# Patient Record
Sex: MALE | Race: WHITE | Employment: FULL TIME | URBAN - METROPOLITAN AREA
[De-identification: names, ages, dates, MRNs, and addresses within clinical notes are randomized per-mention and may not be internally consistent; named-entity substitution may affect disease eponyms.]

---

## 2018-12-18 ENCOUNTER — APPOINTMENT (OUTPATIENT)
Dept: CT IMAGING | Age: 23
DRG: 863 | End: 2018-12-18
Attending: EMERGENCY MEDICINE
Payer: OTHER GOVERNMENT

## 2018-12-18 ENCOUNTER — HOSPITAL ENCOUNTER (INPATIENT)
Age: 23
LOS: 2 days | Discharge: HOME | DRG: 863 | End: 2018-12-21
Attending: EMERGENCY MEDICINE | Admitting: FAMILY MEDICINE
Payer: OTHER GOVERNMENT

## 2018-12-18 DIAGNOSIS — R10.31 ABDOMINAL PAIN, RIGHT LOWER QUADRANT: Primary | ICD-10-CM

## 2018-12-18 LAB
COMMENT, HOLDF: NORMAL
LACTATE BLD-SCNC: 1.15 MMOL/L (ref 0.4–2)
SAMPLES BEING HELD,HOLD: NORMAL

## 2018-12-18 PROCEDURE — 96361 HYDRATE IV INFUSION ADD-ON: CPT

## 2018-12-18 PROCEDURE — 74011250637 HC RX REV CODE- 250/637: Performed by: EMERGENCY MEDICINE

## 2018-12-18 PROCEDURE — 87040 BLOOD CULTURE FOR BACTERIA: CPT

## 2018-12-18 PROCEDURE — 85025 COMPLETE CBC W/AUTO DIFF WBC: CPT

## 2018-12-18 PROCEDURE — 80053 COMPREHEN METABOLIC PANEL: CPT

## 2018-12-18 PROCEDURE — 36415 COLL VENOUS BLD VENIPUNCTURE: CPT

## 2018-12-18 PROCEDURE — 83605 ASSAY OF LACTIC ACID: CPT

## 2018-12-18 PROCEDURE — 74011250636 HC RX REV CODE- 250/636: Performed by: EMERGENCY MEDICINE

## 2018-12-18 PROCEDURE — 99285 EMERGENCY DEPT VISIT HI MDM: CPT

## 2018-12-18 RX ORDER — SODIUM CHLORIDE 0.9 % (FLUSH) 0.9 %
10 SYRINGE (ML) INJECTION
Status: COMPLETED | OUTPATIENT
Start: 2018-12-18 | End: 2018-12-19

## 2018-12-18 RX ORDER — ACETAMINOPHEN 500 MG
1000 TABLET ORAL
Status: COMPLETED | OUTPATIENT
Start: 2018-12-18 | End: 2018-12-18

## 2018-12-18 RX ADMIN — SODIUM CHLORIDE 1000 ML: 900 INJECTION, SOLUTION INTRAVENOUS at 00:00

## 2018-12-18 RX ADMIN — ACETAMINOPHEN 1000 MG: 500 TABLET ORAL at 23:00

## 2018-12-18 RX ADMIN — SODIUM CHLORIDE 1000 ML: 900 INJECTION, SOLUTION INTRAVENOUS at 23:21

## 2018-12-19 ENCOUNTER — APPOINTMENT (OUTPATIENT)
Dept: CT IMAGING | Age: 23
DRG: 863 | End: 2018-12-19
Attending: EMERGENCY MEDICINE
Payer: OTHER GOVERNMENT

## 2018-12-19 ENCOUNTER — APPOINTMENT (OUTPATIENT)
Dept: GENERAL RADIOLOGY | Age: 23
DRG: 863 | End: 2018-12-19
Attending: EMERGENCY MEDICINE
Payer: OTHER GOVERNMENT

## 2018-12-19 PROBLEM — R10.9 ABDOMINAL PAIN: Status: ACTIVE | Noted: 2018-12-19

## 2018-12-19 PROBLEM — R65.10 SIRS (SYSTEMIC INFLAMMATORY RESPONSE SYNDROME) (HCC): Status: ACTIVE | Noted: 2018-12-19

## 2018-12-19 PROBLEM — D72.829 LEUKOCYTOSIS: Status: ACTIVE | Noted: 2018-12-19

## 2018-12-19 PROBLEM — R00.0 TACHYCARDIA: Status: ACTIVE | Noted: 2018-12-19

## 2018-12-19 LAB
ABO + RH BLD: NORMAL
ALBUMIN SERPL-MCNC: 3.7 G/DL (ref 3.5–5)
ALBUMIN/GLOB SERPL: 0.7 {RATIO} (ref 1.1–2.2)
ALP SERPL-CCNC: 86 U/L (ref 45–117)
ALT SERPL-CCNC: 23 U/L (ref 12–78)
ANION GAP SERPL CALC-SCNC: 6 MMOL/L (ref 5–15)
ANION GAP SERPL CALC-SCNC: 7 MMOL/L (ref 5–15)
APPEARANCE UR: CLEAR
AST SERPL-CCNC: 19 U/L (ref 15–37)
ATRIAL RATE: 76 BPM
BACTERIA URNS QL MICRO: NEGATIVE /HPF
BASOPHILS # BLD: 0 K/UL (ref 0–0.1)
BASOPHILS # BLD: 0.1 K/UL (ref 0–0.1)
BASOPHILS NFR BLD: 0 % (ref 0–1)
BASOPHILS NFR BLD: 0 % (ref 0–1)
BILIRUB SERPL-MCNC: 0.7 MG/DL (ref 0.2–1)
BILIRUB UR QL: NEGATIVE
BLOOD GROUP ANTIBODIES SERPL: NORMAL
BUN SERPL-MCNC: 11 MG/DL (ref 6–20)
BUN SERPL-MCNC: 14 MG/DL (ref 6–20)
BUN/CREAT SERPL: 13 (ref 12–20)
BUN/CREAT SERPL: 13 (ref 12–20)
CALCIUM SERPL-MCNC: 8.5 MG/DL (ref 8.5–10.1)
CALCIUM SERPL-MCNC: 9.3 MG/DL (ref 8.5–10.1)
CALCULATED P AXIS, ECG09: 24 DEGREES
CALCULATED R AXIS, ECG10: 50 DEGREES
CALCULATED T AXIS, ECG11: 23 DEGREES
CHLORIDE SERPL-SCNC: 104 MMOL/L (ref 97–108)
CHLORIDE SERPL-SCNC: 96 MMOL/L (ref 97–108)
CO2 SERPL-SCNC: 25 MMOL/L (ref 21–32)
CO2 SERPL-SCNC: 28 MMOL/L (ref 21–32)
COLOR UR: NORMAL
CREAT SERPL-MCNC: 0.82 MG/DL (ref 0.7–1.3)
CREAT SERPL-MCNC: 1.12 MG/DL (ref 0.7–1.3)
DIAGNOSIS, 93000: NORMAL
DIFFERENTIAL METHOD BLD: ABNORMAL
DIFFERENTIAL METHOD BLD: ABNORMAL
EOSINOPHIL # BLD: 0 K/UL (ref 0–0.4)
EOSINOPHIL # BLD: 0.1 K/UL (ref 0–0.4)
EOSINOPHIL NFR BLD: 0 % (ref 0–7)
EOSINOPHIL NFR BLD: 0 % (ref 0–7)
EPITH CASTS URNS QL MICRO: NORMAL /LPF
ERYTHROCYTE [DISTWIDTH] IN BLOOD BY AUTOMATED COUNT: 12.3 % (ref 11.5–14.5)
ERYTHROCYTE [DISTWIDTH] IN BLOOD BY AUTOMATED COUNT: 12.5 % (ref 11.5–14.5)
GLOBULIN SER CALC-MCNC: 5.5 G/DL (ref 2–4)
GLUCOSE SERPL-MCNC: 94 MG/DL (ref 65–100)
GLUCOSE SERPL-MCNC: 97 MG/DL (ref 65–100)
GLUCOSE UR STRIP.AUTO-MCNC: NEGATIVE MG/DL
HCT VFR BLD AUTO: 35 % (ref 36.6–50.3)
HCT VFR BLD AUTO: 43 % (ref 36.6–50.3)
HGB BLD-MCNC: 11.6 G/DL (ref 12.1–17)
HGB BLD-MCNC: 14.5 G/DL (ref 12.1–17)
HGB UR QL STRIP: NEGATIVE
IMM GRANULOCYTES # BLD: 0 K/UL
IMM GRANULOCYTES # BLD: 0.2 K/UL (ref 0–0.04)
IMM GRANULOCYTES NFR BLD AUTO: 0 %
IMM GRANULOCYTES NFR BLD AUTO: 1 % (ref 0–0.5)
KETONES UR QL STRIP.AUTO: NEGATIVE MG/DL
LACTATE SERPL-SCNC: 0.9 MMOL/L (ref 0.4–2)
LEUKOCYTE ESTERASE UR QL STRIP.AUTO: NEGATIVE
LYMPHOCYTES # BLD: 1.7 K/UL (ref 0.8–3.5)
LYMPHOCYTES # BLD: 3 K/UL (ref 0.8–3.5)
LYMPHOCYTES NFR BLD: 13 % (ref 12–49)
LYMPHOCYTES NFR BLD: 9 % (ref 12–49)
MCH RBC QN AUTO: 29.6 PG (ref 26–34)
MCH RBC QN AUTO: 30.3 PG (ref 26–34)
MCHC RBC AUTO-ENTMCNC: 33.1 G/DL (ref 30–36.5)
MCHC RBC AUTO-ENTMCNC: 33.7 G/DL (ref 30–36.5)
MCV RBC AUTO: 89.3 FL (ref 80–99)
MCV RBC AUTO: 89.8 FL (ref 80–99)
MONOCYTES # BLD: 1.8 K/UL (ref 0–1)
MONOCYTES # BLD: 2.7 K/UL (ref 0–1)
MONOCYTES NFR BLD: 10 % (ref 5–13)
MONOCYTES NFR BLD: 12 % (ref 5–13)
NEUTS SEG # BLD: 15.2 K/UL (ref 1.8–8)
NEUTS SEG # BLD: 17.2 K/UL (ref 1.8–8)
NEUTS SEG NFR BLD: 75 % (ref 32–75)
NEUTS SEG NFR BLD: 80 % (ref 32–75)
NITRITE UR QL STRIP.AUTO: NEGATIVE
NRBC # BLD: 0 K/UL (ref 0–0.01)
NRBC # BLD: 0 K/UL (ref 0–0.01)
NRBC BLD-RTO: 0 PER 100 WBC
NRBC BLD-RTO: 0 PER 100 WBC
P-R INTERVAL, ECG05: 142 MS
PH UR STRIP: 6 [PH] (ref 5–8)
PLATELET # BLD AUTO: 282 K/UL (ref 150–400)
PLATELET # BLD AUTO: 356 K/UL (ref 150–400)
PMV BLD AUTO: 10.1 FL (ref 8.9–12.9)
PMV BLD AUTO: 9.9 FL (ref 8.9–12.9)
POTASSIUM SERPL-SCNC: 4.1 MMOL/L (ref 3.5–5.1)
POTASSIUM SERPL-SCNC: 4.2 MMOL/L (ref 3.5–5.1)
PROT SERPL-MCNC: 9.2 G/DL (ref 6.4–8.2)
PROT UR STRIP-MCNC: NEGATIVE MG/DL
Q-T INTERVAL, ECG07: 404 MS
QRS DURATION, ECG06: 94 MS
QTC CALCULATION (BEZET), ECG08: 454 MS
RBC # BLD AUTO: 3.92 M/UL (ref 4.1–5.7)
RBC # BLD AUTO: 4.79 M/UL (ref 4.1–5.7)
RBC #/AREA URNS HPF: NORMAL /HPF (ref 0–5)
RBC MORPH BLD: ABNORMAL
SODIUM SERPL-SCNC: 131 MMOL/L (ref 136–145)
SODIUM SERPL-SCNC: 135 MMOL/L (ref 136–145)
SP GR UR REFRACTOMETRY: 1.01 (ref 1–1.03)
SPECIMEN EXP DATE BLD: NORMAL
UR CULT HOLD, URHOLD: NORMAL
UROBILINOGEN UR QL STRIP.AUTO: 0.2 EU/DL (ref 0.2–1)
VENTRICULAR RATE, ECG03: 76 BPM
WBC # BLD AUTO: 19 K/UL (ref 4.1–11.1)
WBC # BLD AUTO: 22.9 K/UL (ref 4.1–11.1)
WBC URNS QL MICRO: NORMAL /HPF (ref 0–4)

## 2018-12-19 PROCEDURE — 74011000258 HC RX REV CODE- 258: Performed by: RADIOLOGY

## 2018-12-19 PROCEDURE — 86900 BLOOD TYPING SEROLOGIC ABO: CPT

## 2018-12-19 PROCEDURE — 81001 URINALYSIS AUTO W/SCOPE: CPT

## 2018-12-19 PROCEDURE — 65660000000 HC RM CCU STEPDOWN

## 2018-12-19 PROCEDURE — 80048 BASIC METABOLIC PNL TOTAL CA: CPT

## 2018-12-19 PROCEDURE — 83605 ASSAY OF LACTIC ACID: CPT

## 2018-12-19 PROCEDURE — 71250 CT THORAX DX C-: CPT

## 2018-12-19 PROCEDURE — 74011000258 HC RX REV CODE- 258: Performed by: FAMILY MEDICINE

## 2018-12-19 PROCEDURE — 96374 THER/PROPH/DIAG INJ IV PUSH: CPT

## 2018-12-19 PROCEDURE — 74011250636 HC RX REV CODE- 250/636: Performed by: EMERGENCY MEDICINE

## 2018-12-19 PROCEDURE — 74177 CT ABD & PELVIS W/CONTRAST: CPT

## 2018-12-19 PROCEDURE — 74011000258 HC RX REV CODE- 258: Performed by: EMERGENCY MEDICINE

## 2018-12-19 PROCEDURE — 71046 X-RAY EXAM CHEST 2 VIEWS: CPT

## 2018-12-19 PROCEDURE — 85025 COMPLETE CBC W/AUTO DIFF WBC: CPT

## 2018-12-19 PROCEDURE — 74011250636 HC RX REV CODE- 250/636: Performed by: FAMILY MEDICINE

## 2018-12-19 PROCEDURE — 93005 ELECTROCARDIOGRAM TRACING: CPT

## 2018-12-19 PROCEDURE — 36415 COLL VENOUS BLD VENIPUNCTURE: CPT

## 2018-12-19 PROCEDURE — 74011636320 HC RX REV CODE- 636/320: Performed by: RADIOLOGY

## 2018-12-19 RX ORDER — ONDANSETRON 2 MG/ML
4 INJECTION INTRAMUSCULAR; INTRAVENOUS
Status: DISCONTINUED | OUTPATIENT
Start: 2018-12-19 | End: 2018-12-21 | Stop reason: HOSPADM

## 2018-12-19 RX ORDER — LEVOFLOXACIN 5 MG/ML
750 INJECTION, SOLUTION INTRAVENOUS EVERY 24 HOURS
Status: DISCONTINUED | OUTPATIENT
Start: 2018-12-19 | End: 2018-12-21 | Stop reason: HOSPADM

## 2018-12-19 RX ORDER — LEVOFLOXACIN 5 MG/ML
750 INJECTION, SOLUTION INTRAVENOUS ONCE
Status: DISCONTINUED | OUTPATIENT
Start: 2018-12-19 | End: 2018-12-19 | Stop reason: SDUPTHER

## 2018-12-19 RX ORDER — SODIUM CHLORIDE 9 MG/ML
25 INJECTION, SOLUTION INTRAVENOUS CONTINUOUS
Status: DISCONTINUED | OUTPATIENT
Start: 2018-12-19 | End: 2018-12-21 | Stop reason: HOSPADM

## 2018-12-19 RX ORDER — VANCOMYCIN HYDROCHLORIDE
1250 EVERY 8 HOURS
Status: DISCONTINUED | OUTPATIENT
Start: 2018-12-19 | End: 2018-12-21 | Stop reason: HOSPADM

## 2018-12-19 RX ORDER — ACETAMINOPHEN 325 MG/1
650 TABLET ORAL
Status: DISCONTINUED | OUTPATIENT
Start: 2018-12-19 | End: 2018-12-21 | Stop reason: HOSPADM

## 2018-12-19 RX ORDER — SODIUM CHLORIDE 0.9 % (FLUSH) 0.9 %
5-10 SYRINGE (ML) INJECTION EVERY 8 HOURS
Status: DISCONTINUED | OUTPATIENT
Start: 2018-12-19 | End: 2018-12-21 | Stop reason: HOSPADM

## 2018-12-19 RX ORDER — ACETAMINOPHEN 650 MG/1
650 SUPPOSITORY RECTAL
Status: DISCONTINUED | OUTPATIENT
Start: 2018-12-19 | End: 2018-12-19

## 2018-12-19 RX ORDER — SODIUM CHLORIDE 0.9 % (FLUSH) 0.9 %
5-10 SYRINGE (ML) INJECTION AS NEEDED
Status: DISCONTINUED | OUTPATIENT
Start: 2018-12-19 | End: 2018-12-21 | Stop reason: HOSPADM

## 2018-12-19 RX ORDER — ACETAMINOPHEN 325 MG/1
650 TABLET ORAL
Status: DISCONTINUED | OUTPATIENT
Start: 2018-12-19 | End: 2018-12-19

## 2018-12-19 RX ORDER — VANCOMYCIN 2 GRAM/500 ML IN 0.9 % SODIUM CHLORIDE INTRAVENOUS
2000 ONCE
Status: COMPLETED | OUTPATIENT
Start: 2018-12-19 | End: 2018-12-19

## 2018-12-19 RX ORDER — FENTANYL CITRATE 50 UG/ML
12.5 INJECTION, SOLUTION INTRAMUSCULAR; INTRAVENOUS
Status: DISCONTINUED | OUTPATIENT
Start: 2018-12-19 | End: 2018-12-21 | Stop reason: HOSPADM

## 2018-12-19 RX ADMIN — VANCOMYCIN HYDROCHLORIDE 2000 MG: 10 INJECTION, POWDER, LYOPHILIZED, FOR SOLUTION INTRAVENOUS at 04:12

## 2018-12-19 RX ADMIN — VANCOMYCIN HYDROCHLORIDE 1250 MG: 10 INJECTION, POWDER, LYOPHILIZED, FOR SOLUTION INTRAVENOUS at 19:32

## 2018-12-19 RX ADMIN — SODIUM CHLORIDE 1000 ML: 900 INJECTION, SOLUTION INTRAVENOUS at 04:44

## 2018-12-19 RX ADMIN — SODIUM CHLORIDE 448 ML: 900 INJECTION, SOLUTION INTRAVENOUS at 04:44

## 2018-12-19 RX ADMIN — VANCOMYCIN HYDROCHLORIDE 1250 MG: 10 INJECTION, POWDER, LYOPHILIZED, FOR SOLUTION INTRAVENOUS at 11:45

## 2018-12-19 RX ADMIN — PIPERACILLIN SODIUM,TAZOBACTAM SODIUM 3.38 G: 3; .375 INJECTION, POWDER, FOR SOLUTION INTRAVENOUS at 16:02

## 2018-12-19 RX ADMIN — IOHEXOL 50 ML: 240 INJECTION, SOLUTION INTRATHECAL; INTRAVASCULAR; INTRAVENOUS; ORAL at 00:36

## 2018-12-19 RX ADMIN — SODIUM CHLORIDE 100 ML: 900 INJECTION, SOLUTION INTRAVENOUS at 00:36

## 2018-12-19 RX ADMIN — LEVOFLOXACIN 750 MG: 5 INJECTION, SOLUTION INTRAVENOUS at 06:45

## 2018-12-19 RX ADMIN — IOPAMIDOL 100 ML: 755 INJECTION, SOLUTION INTRAVENOUS at 00:36

## 2018-12-19 RX ADMIN — Medication 10 ML: at 00:36

## 2018-12-19 RX ADMIN — PIPERACILLIN SODIUM, TAZOBACTAM SODIUM 3.38 G: 3; .375 INJECTION, POWDER, LYOPHILIZED, FOR SOLUTION INTRAVENOUS at 02:04

## 2018-12-19 RX ADMIN — SODIUM CHLORIDE 125 ML/HR: 900 INJECTION, SOLUTION INTRAVENOUS at 06:45

## 2018-12-19 RX ADMIN — SODIUM CHLORIDE 125 ML/HR: 900 INJECTION, SOLUTION INTRAVENOUS at 22:09

## 2018-12-19 RX ADMIN — PIPERACILLIN SODIUM,TAZOBACTAM SODIUM 3.38 G: 3; .375 INJECTION, POWDER, FOR SOLUTION INTRAVENOUS at 08:38

## 2018-12-19 NOTE — PROGRESS NOTES
Hospitalist Progress Note  Roni Null MD  Answering service: [phone] OR [phone] from in house phone        Date of Service:  2018  NAME:  Delio Abernathy  :  1995  MRN:  [id number]      Admission Summary:   20year old male, with h/o perforated appendix s/p lap appe in [facility],d/ce on Abx x 5 days  for RLQ abd pain and fever a/w chills body aches,decreased appetite and nausea  Pt admitted with sepsis,T 102.9,tachy,wbc 22k  Interval history / Subjective:   RLQ abd pain,controlled on rest ,no nausea vomiting  Reports headache, has fever   [id number]. wbc  19<-22, lactic acid 1.15  Assessment & Plan:     Sepsis s/p lap cosmo for perforated appendix in [facility] de/c 06  -on levaquin,zosyn,vanco,f/u cx  --Febrile  - HR, leukocytosis improving  -CT with RLQ inflammation, radiopaque foreign body possible surgical material  -surgery consulted Dr Kody Gray attempting to contact [facility] in [facility] to inquire about the '   type of closure material used, No abscess , no sx   -advanced diet to clears,recc to cont antibiotics per surg  pain meds,tylenol prn , antiemetics  -decrease IVF ,once po is adequate     Code status: Full   DVT prophylaxis:SCD    Care Plan discussed with: Patient/Family and Nurse  Disposition: Home w/Family and TBD     Hospital Problems  Date Reviewed: 2018          Codes Class Noted POA    Leukocytosis ICD-10-CM: B05.093  ICD-9-CM: 288.60  2018 Unknown        Abdominal pain ICD-10-CM: R10.9  ICD-9-CM: 789.00  2018 Unknown        Tachycardia ICD-10-CM: R00.0  ICD-9-CM: 785.0  2018 Unknown        * (Principal) SIRS (systemic inflammatory response syndrome) ([facility] [address] ICD-10-CM: R65.10  ICD-9-CM: 995.90  2018 Unknown                Review of Systems:   A comprehensive review of systems was negative except for that written in the HPI.        Vital Signs:    Last 24hrs VS reviewed since prior progress note. Most recent are:  Visit Vitals  /69 (BP 1 Location: Left arm, BP Patient Position: At rest)   Pulse 74   Temp 98.1 °F (36.7 °C)   Resp 18   Ht 6' 3\" (1.905 m)   Wt 81.6 kg (180 lb)   SpO2 97%   BMI 22.50 kg/m²       No intake or output data in the 24 hours ending 12/19/18 0759     Physical Examination:             Constitutional:  No acute distress, cooperative, pleasant    ENT:  Oral mucous moist, oropharynx benign. Neck supple,    Resp:  CTA bilaterally. No wheezing/rhonchi/rales. No accessory muscle use   CV:  Regular rhythm, normal rate, no murmurs, gallops, rubs    GI:     Musculoskeletal:  No edema, warm, 2+ pulses throughout    Neurologic:  Moves all extremities. AAOx3, CN II-XII reviewed     Psych:  Good insight, Not anxious nor agitated. Data Review:    Review and/or order of clinical lab test      Labs:     Recent Labs     12/19/18  0642 12/18/18  2319   WBC 19.0* 22.9*   HGB 11.6* 14.5   HCT 35.0* 43.0    356     Recent Labs     12/19/18  0642 12/18/18  2319   * 131*   K 4.2 4.1    96*   CO2 25 28   BUN 11 14   CREA 0.82 1.12   GLU 94 97   CA 8.5 9.3     Recent Labs     12/18/18  2319   SGOT 19   ALT 23   AP 86   TBILI 0.7   TP 9.2*   ALB 3.7   GLOB 5.5*     No results for input(s): INR, PTP, APTT in the last 72 hours. No lab exists for component: INREXT   No results for input(s): FE, TIBC, PSAT, FERR in the last 72 hours. No results found for: FOL, RBCF   No results for input(s): PH, PCO2, PO2 in the last 72 hours. No results for input(s): CPK, CKNDX, TROIQ in the last 72 hours.     No lab exists for component: CPKMB  No results found for: CHOL, CHOLX, CHLST, CHOLV, HDL, LDL, LDLC, DLDLP, TGLX, TRIGL, TRIGP, CHHD, CHHDX  No results found for: [facility]  Lab Results   Component Value Date/Time    Color YELLOW/STRAW 12/19/2018 01:15 AM    Appearance CLEAR 12/19/2018 01:15 AM    Specific gravity 1.010 12/19/2018 01:15 AM    pH (UA) 6.0 12/19/2018 01:15 AM Protein NEGATIVE  12/19/2018 01:15 AM    Glucose NEGATIVE  12/19/2018 01:15 AM    Ketone NEGATIVE  12/19/2018 01:15 AM    Bilirubin NEGATIVE  12/19/2018 01:15 AM    Urobilinogen 0.2 12/19/2018 01:15 AM    Nitrites NEGATIVE  12/19/2018 01:15 AM    Leukocyte Esterase NEGATIVE  12/19/2018 01:15 AM    Epithelial cells FEW 12/19/2018 01:15 AM    Bacteria NEGATIVE  12/19/2018 01:15 AM    WBC 0-4 12/19/2018 01:15 AM    RBC 0-5 12/19/2018 01:15 AM         Medications Reviewed:     Current Facility-Administered Medications   Medication Dose Route Frequency    sodium chloride (NS) flush 5-10 mL  5-10 mL IntraVENous Q8H    sodium chloride (NS) flush 5-10 mL  5-10 mL IntraVENous PRN    sodium chloride (NS) flush 5-10 mL  5-10 mL IntraVENous PRN    0.9% sodium chloride infusion  125 mL/hr IntraVENous CONTINUOUS    piperacillin-tazobactam (ZOSYN) 3.375 g in 0.9% sodium chloride (MBP/ADV) 100 mL  3.375 g IntraVENous Q8H    levoFLOXacin (LEVAQUIN) 750 mg in D5W IVPB  750 mg IntraVENous Q24H    VANCOMYCIN INFORMATION NOTE   Other PRN    fentaNYL citrate (PF) injection 12.5 mcg  12.5 mcg IntraVENous Q4H PRN     ______________________________________________________________________  EXPECTED LENGTH OF STAY: - - -  ACTUAL LENGTH OF STAY:          0                 Sunday Garcia MD

## 2018-12-19 NOTE — CONSULTS
Chief Complaint:  Abdominal pain with fever following lap appendectomy    HPI:  21 yo man with recent appendectomy for perforated appendicitis with abscess on December 6, 2018 in the [address]. Pt reported he had operation at [address] in [place]. Operation reportedly was difficult because he had retrocecal appendix that was perforated. Pt had drain placed and was removed two days later. He stated he started having fevers and severe abdominal pain yesterday. No nausea or vomiting. Pt was noted to have leukocytosis and CT scan showed colonic inflammation. There was a question of a foreign body in the appendiceal stump. Past Medical History:   Diagnosis Date    Appendicitis        Past Surgical History:   Procedure Laterality Date    HX APPENDECTOMY         No current facility-administered medications on file prior to encounter. No current outpatient medications on file prior to encounter.        No Known Allergies    Review of Systems - General ROS: negative  Psychological ROS: negative  Respiratory ROS: negative  Cardiovascular ROS: negative  Gastrointestinal ROS: positive for - abdominal pain     Visit Vitals  /70   Pulse 83   Temp 99.3 °F (37.4 °C)   Resp 17   Ht 6' 3\" (1.905 m)   Wt 180 lb (81.6 kg)   SpO2 97%   BMI 22.50 kg/m²         Physical Exam:    Gen:  NAD  Pulm:  Unlabored  Abd:  S/ND/mild TTP without guarding or rebound  Wound:  C/D/I    Recent Results (from the past 24 hour(s))   CBC WITH AUTOMATED DIFF    Collection Time: 12/18/18 11:19 PM   Result Value Ref Range    WBC 22.9 (H) 4.1 - 11.1 K/uL    RBC 4.79 4.10 - 5.70 M/uL    HGB 14.5 12.1 - 17.0 g/dL    HCT 43.0 36.6 - 50.3 %    MCV 89.8 80.0 - 99.0 FL    MCH 30.3 26.0 - 34.0 PG    MCHC 33.7 30.0 - 36.5 g/dL    RDW 12.3 11.5 - 14.5 %    PLATELET 470 768 - 488 K/uL    MPV 9.9 8.9 - 12.9 FL    NRBC 0.0 0  WBC    ABSOLUTE NRBC 0.00 0.00 - 0.01 K/uL    NEUTROPHILS 75 32 - 75 %    LYMPHOCYTES 13 12 - 49 %    MONOCYTES 12 5 - 13 %    EOSINOPHILS 0 0 - 7 %    BASOPHILS 0 0 - 1 %    IMMATURE GRANULOCYTES 0 %    ABS. NEUTROPHILS 17.2 (H) 1.8 - 8.0 K/UL    ABS. LYMPHOCYTES 3.0 0.8 - 3.5 K/UL    ABS. MONOCYTES 2.7 (H) 0.0 - 1.0 K/UL    ABS. EOSINOPHILS 0.0 0.0 - 0.4 K/UL    ABS. BASOPHILS 0.0 0.0 - 0.1 K/UL    ABS. IMM. GRANS. 0.0 K/UL    DF MANUAL      RBC COMMENTS NORMOCYTIC, NORMOCHROMIC     METABOLIC PANEL, COMPREHENSIVE    Collection Time: 12/18/18 11:19 PM   Result Value Ref Range    Sodium 131 (L) 136 - 145 mmol/L    Potassium 4.1 3.5 - 5.1 mmol/L    Chloride 96 (L) 97 - 108 mmol/L    CO2 28 21 - 32 mmol/L    Anion gap 7 5 - 15 mmol/L    Glucose 97 65 - 100 mg/dL    BUN 14 6 - 20 MG/DL    Creatinine 1.12 0.70 - 1.30 MG/DL    BUN/Creatinine ratio 13 12 - 20      GFR est AA >60 >60 ml/min/1.73m2    GFR est non-AA >60 >60 ml/min/1.73m2    Calcium 9.3 8.5 - 10.1 MG/DL    Bilirubin, total 0.7 0.2 - 1.0 MG/DL    ALT (SGPT) 23 12 - 78 U/L    AST (SGOT) 19 15 - 37 U/L    Alk. phosphatase 86 45 - 117 U/L    Protein, total 9.2 (H) 6.4 - 8.2 g/dL    Albumin 3.7 3.5 - 5.0 g/dL    Globulin 5.5 (H) 2.0 - 4.0 g/dL    A-G Ratio 0.7 (L) 1.1 - 2.2     SAMPLES BEING HELD    Collection Time: 12/18/18 11:19 PM   Result Value Ref Range    SAMPLES BEING HELD  1RED     COMMENT        Add-on orders for these samples will be processed based on acceptable specimen integrity and analyte stability, which may vary by analyte.    CULTURE, BLOOD, PAIRED    Collection Time: 12/18/18 11:19 PM   Result Value Ref Range    Special Requests: NO SPECIAL REQUESTS      Culture result: NO GROWTH AFTER 5 HOURS     POC LACTIC ACID    Collection Time: 12/18/18 11:21 PM   Result Value Ref Range    Lactic Acid (POC) 1.15 0.40 - 2.00 mmol/L   URINALYSIS W/MICROSCOPIC    Collection Time: 12/19/18  1:15 AM   Result Value Ref Range    Color YELLOW/STRAW      Appearance CLEAR CLEAR      Specific gravity 1.010 1.003 - 1.030      pH (UA) 6.0 5.0 - 8.0      Protein NEGATIVE  NEG mg/dL Glucose NEGATIVE  NEG mg/dL    Ketone NEGATIVE  NEG mg/dL    Bilirubin NEGATIVE  NEG      Blood NEGATIVE  NEG      Urobilinogen 0.2 0.2 - 1.0 EU/dL    Nitrites NEGATIVE  NEG      Leukocyte Esterase NEGATIVE  NEG      WBC 0-4 0 - 4 /hpf    RBC 0-5 0 - 5 /hpf    Epithelial cells FEW FEW /lpf    Bacteria NEGATIVE  NEG /hpf   URINE CULTURE HOLD SAMPLE    Collection Time: 12/19/18  1:15 AM   Result Value Ref Range    Urine culture hold        URINE ON HOLD IN MICROBIOLOGY DEPT FOR 3 DAYS. IF UNPRESERVED URINE IS SUBMITTED, IT CANNOT BE USED FOR ADDITIONAL TESTING AFTER 24 HRS, RECOLLECTION WILL BE REQUIRED.    EKG, 12 LEAD, INITIAL    Collection Time: 12/19/18  2:39 AM   Result Value Ref Range    Ventricular Rate 76 BPM    Atrial Rate 76 BPM    P-R Interval 142 ms    QRS Duration 94 ms    Q-T Interval 404 ms    QTC Calculation (Bezet) 454 ms    Calculated P Axis 24 degrees    Calculated R Axis 50 degrees    Calculated T Axis 23 degrees    Diagnosis       Normal sinus rhythm  No previous ECGs available  Confirmed by Agnes Hudson M.D., Suzzane Cocker ([id number]) on 12/19/2018 6:36:22 AM     TYPE & SCREEN    Collection Time: 12/19/18  6:42 AM   Result Value Ref Range    Crossmatch Expiration 12/22/2018     ABO/Rh(D) A POSITIVE     Antibody screen NEG    METABOLIC PANEL, BASIC    Collection Time: 12/19/18  6:42 AM   Result Value Ref Range    Sodium 135 (L) 136 - 145 mmol/L    Potassium 4.2 3.5 - 5.1 mmol/L    Chloride 104 97 - 108 mmol/L    CO2 25 21 - 32 mmol/L    Anion gap 6 5 - 15 mmol/L    Glucose 94 65 - 100 mg/dL    BUN 11 6 - 20 MG/DL    Creatinine 0.82 0.70 - 1.30 MG/DL    BUN/Creatinine ratio 13 12 - 20      GFR est AA >60 >60 ml/min/1.73m2    GFR est non-AA >60 >60 ml/min/1.73m2    Calcium 8.5 8.5 - 10.1 MG/DL   CBC WITH AUTOMATED DIFF    Collection Time: 12/19/18  6:42 AM   Result Value Ref Range    WBC 19.0 (H) 4.1 - 11.1 K/uL    RBC 3.92 (L) 4.10 - 5.70 M/uL    HGB 11.6 (L) 12.1 - 17.0 g/dL    HCT 35.0 (L) 36.6 - 50.3 % MCV 89.3 80.0 - 99.0 FL    MCH 29.6 26.0 - 34.0 PG    MCHC 33.1 30.0 - 36.5 g/dL    RDW 12.5 11.5 - 14.5 %    PLATELET 562 982 - 979 K/uL    MPV 10.1 8.9 - 12.9 FL    NRBC 0.0 0  WBC    ABSOLUTE NRBC 0.00 0.00 - 0.01 K/uL    NEUTROPHILS 80 (H) 32 - 75 %    LYMPHOCYTES 9 (L) 12 - 49 %    MONOCYTES 10 5 - 13 %    EOSINOPHILS 0 0 - 7 %    BASOPHILS 0 0 - 1 %    IMMATURE GRANULOCYTES 1 (H) 0.0 - 0.5 %    ABS. NEUTROPHILS 15.2 (H) 1.8 - 8.0 K/UL    ABS. LYMPHOCYTES 1.7 0.8 - 3.5 K/UL    ABS. MONOCYTES 1.8 (H) 0.0 - 1.0 K/UL    ABS. EOSINOPHILS 0.1 0.0 - 0.4 K/UL    ABS. BASOPHILS 0.1 0.0 - 0.1 K/UL    ABS. IMM. GRANS. 0.2 (H) 0.00 - 0.04 K/UL    DF AUTOMATED     LACTIC ACID    Collection Time: 12/19/18  9:15 AM   Result Value Ref Range    Lactic acid 0.9 0.4 - 2.0 MMOL/L       AP:  21 yo man with abdominal pain and fever following lap appendectomy in the [facility]    - No acute surgical issues  - Colonic inflammation is not unusual following perforated appendicitis with abscess. There is no abscess collection on CT scan to warrant drainage  - Recommend continuing with IV antibiotic  - Clear liquid diet  - Questionable foreign body at appendiceal stump. This may be some kind of closure material for the perforated appendix however unclear given [place]. Attempted to contact Dr. Janie Alanis at [facility] in [facility] but number does not work.   Asked patient to obtain operative report from his [facility] records to be faxed to General surgery office

## 2018-12-19 NOTE — PROGRESS NOTES
TRANSFER - IN REPORT:    Verbal report received from [facility] (name) on Truong Cazares  being received from ED (unit) for routine progression of care      Report consisted of patients Situation, Background, Assessment and   Recommendations(SBAR). Information from the following report(s) SBAR, Kardex, Intake/Output, MAR and Recent Results was reviewed with the receiving nurse. Opportunity for questions and clarification was provided. Assessment completed upon patients arrival to unit and care assumed.

## 2018-12-19 NOTE — ED TRIAGE NOTES
Patient arrives to the ED with c/o abdo pain, patient states he had a lap appy x 1 week go, states a slight fever over the last couple of day, no nausea or vomiting noted, states some pain with urination.

## 2018-12-19 NOTE — H&P
[address]   HISTORY AND PHYSICAL      Lorraine RUIZ  MR#: [id number]  : 1995  ACCOUNT #: [de-identified]   ADMIT DATE: 2018    CHIEF COMPLAINT:  Abdominal pain. HISTORY OF PRESENT ILLNESS:  This 40-year-old white male with a past medical history of appendicitis status post recent laparoscopic appendectomy, presented to the emergency department from home with chief complaint of abdominal pain and fever. According to the reports, the patient had developed appendicitis and underwent laparoscopic appendectomy on 2018 at a hospital in the [facility]. He is notably in the [employer]. He notes that he was hospitalized approximately for 5 days on IV antibiotics. After discharge, he reportedly then flew to [place], and from [place] to [place], and from [place] he was reportedly transported to [place]. He notably has had ongoing abdominal pain for over the last 3 days, as well as fever as high as 100.5 degrees Fahrenheit. He had developed generalized body aches and chills. Abdominal pain was notably mostly in the right lower quadrant, became more generalized, radiating throughout his abdomen, sharp in character, severe, without specific alleviating factors and exacerbated with movement or touch. He reportedly took Motrin yesterday morning. Notably has had decreased appetite, some nausea. Currently, he does not report vomiting. On arrival to emergency department tonight, initial recorded vital signs were temperature of 102.9 degrees Fahrenheit, blood pressure 123/72, heart rate 115, respiratory rate 16, O2 saturation 100% on room air. Workup including labs showed elevated WBC of 22,900. CT abdomen and pelvis with IV contrast showed significant right lower quadrant inflammation, status post surgery for ruptured appendicitis with radiopaque foreign body in the right lower quadrant and unclear if possible surgical material retained from prior surgery.   ED consulted general surgeon on call. Request was for the patient to the admitted to the hospitalist service. At current, patient is not complaining of any syncope, loss of consciousness, headache, neck pain, visual disturbance, numbness, paresthesias, focal weakness, chest pain, palpitations, diarrhea, calf pain, increased leg swelling/ edema, or rash. PAST MEDICAL HISTORY:  Appendicitis. PAST SURGICAL HISTORY:  Laparoscopic appendectomy, 12/06/2018. MEDICATIONS:  None. ALLERGIES:  NO KNOWN DRUG ALLERGIES. SOCIAL HISTORY:  Denies smoking, alcohol or illicit drugs. FAMILY HISTORY:  Unknown regarding heart attack, stroke. REVIEW OF SYSTEMS:  Pertinent positives as noted in HPI. All other systems were reviewed and negative. PHYSICAL EXAMINATION:  VITAL SIGNS:  Temperature max was 102.9 degrees Fahrenheit, temperature current 98.8 degrees Fahrenheit, blood pressure 113/54, heart rate 72, respiratory rate 20, O2 saturation 96% on room air. Recorded weight of 180 pounds (81.6 kg). Recorded height of 6 feet 3 inches tall. GENERAL:  The patient in no acute respiratory distress. PSYCHIATRIC:  The patient is awake, alert and oriented x3. NEUROLOGIC:  GCS of 15. Moves extremities x4. Sensation grossly intact. No slurred speech or facial droop. HEENT:  Normocephalic, atraumatic. PERRLA. EOMs intact. Sclerae anicteric. Conjunctivae clear. Nares patent. Oropharynx clear. Tongue at midline, nonedematous. NECK:  Supple without lymphadenopathy, JVD, carotid bruits, or thyromegaly. LYMPHATICS:  No cervical or supraclavicular adenopathy. RESPIRATORY:  Lungs clear to auscultation bilaterally. CARDIOVASCULAR:  Heart regular rate and rhythm, normal S1, S2, without murmurs, rubs or gallops. GASTROINTESTINAL:  Abdomen is soft. Generalized tenderness on light palpation with voluntary guarding, no rebound, no rigidity. No auscultated abdominal bruits or pulsatile mass.   Surgical incision sites in the right lower quadrant periumbilical and left lower quadrant are intact without any surrounding erythema, warmth, edema or drainage. BACK:  No CVA tenderness. No step-off deformity. MUSCULOSKELETAL:  No acute palpable bony deformity. Negative for calf tenderness. VASCULAR:  2+ radial and DP pulses without cyanosis, clubbing or edema. SKIN:  Warm and dry. LABORATORY DATA:  I reviewed. Sodium is 131, potassium 4.1, chloride 96, CO2 of 28, BUN 14, creatinine 1.12, glucose of 97, anion gap 7, calcium 9.3, GFR greater than 60, total bilirubin 0.7, total protein 9.2, albumin 3.7. ALT is 23, AST 19, alk phosphatase of 86. WBC 22.9, hemoglobin 14.5, hematocrit 43.0, platelets 055, neutrophils 75%. Urinalysis:  Leukocyte esterase negative, nitrites negative, urobilinogen 0.2, bilirubin negative, blood negative, ketones negative, glucose negative, protein negative, pH of 6.0, specific gravity 1.010, wbc's 0-4, rbc's 0-5, bacteria negative. A CT of the chest without contrast showed no acute process. CT abdomen and pelvis with IV contrast results are reviewed and noted in HPI. Chest x-ray PA and lateral, no acute process. A 12-lead EKG normal sinus rhythm at 76 beats per minute. ASSESSMENT AND PLAN:  1. Generalized abdominal pain. Concern at this time is for radiopaque foreign body seen in the right lower quadrant with noted significant right lower quadrant inflammation on CT report. General surgeon was consulted. However, the patient still had significant abdominal tenderness on exam.  Continue IV fluids for hydration. Keep NPO. 2.  Systemic inflammatory response syndrome with noted fever, tachycardia, as well as leukocytosis. Order broad IV antibiotic coverage with Zosyn, levofloxacin and vancomycin. At this time, it is not definite that the infection is from the right lower quadrant. Cover for other potential sources.   As such, I have ordered the equivalent of 30 mL/kg IV fluid bolus for hydration. Check blood cultures. Consider for, as mentioned, potential infection in the right lower quadrant and concern for potential for foreign body. This may be surgical material from prior surgery; however, no records are currently available to review from the hospital in the [facility]. 3.  Fever. Plan as noted above. 4.  Leukocytosis. Repeat CBC. 5.  Acute hyponatremia. Repeat sodium level in the a.m.  6.  Tachycardia. Continue monitoring. 7.  Nausea. May have Zofran. 8.  Venous thromboembolism prophylaxis. SCDs to bilateral lower extremities. MD VIRGINIA Bliss/AMINA  D: 12/19/2018 05:55     T: 12/19/2018 06:40  JOB #: [id number]
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
## 2018-12-19 NOTE — PROGRESS NOTES
Care Management Interventions  PCP Verified by CM: Yes  Palliative Care Criteria Met (RRAT>21 & CHF Dx)?: No  Mode of Transport at Discharge: Self  Transition of Care Consult (CM Consult): Other(no need identified at this time)  MyChart Signup: No  Discharge Durable Medical Equipment: No  Health Maintenance Reviewed: Yes  Physical Therapy Consult: No  Occupational Therapy Consult: No  Speech Therapy Consult: No  Current Support Network: Own Home  Confirm Follow Up Transport: Family  Plan discussed with Pt/Family/Caregiver: Yes  Freedom of Choice Offered: Yes  [address] Provided?: No  Discharge Location  Discharge Placement: Home    Reason for Admission:   SIRS, Status post Lap [place] patient in [employer].                     RRAT Score:  0                   Plan for utilizing home health:      No                    Likelihood of Readmission:  low                         Transition of Care Plan:  Home

## 2018-12-19 NOTE — ED PROVIDER NOTES
Pt is a 20year old male, with no significant history, presents to the ED for abd pain and fever. Pt states that on Dec 6th her had perforation of his appendix and had surgery in the [facility]. Pt reports that he had abdominal pain for 5 days before they could get him on the boat. He is in the [employer]. He did have laparoscopic appendectomy. He was discharged on 5 days of an antibiotic, unsure of the name. Pt then developed abd pain 3 day ago and a fever of 100.5 yesterday. He has also had body aches and chills. Since then the abdominal has progressively become worse. The pain is sharp in nature. It is generalized but worse in the RLQ. He has also notice dysuria. He last took Motrin this morning. Today he has had a decreased appetite and nausea. He denies any vomiting, SOB, rash, constipation, diarrhea. Past Medical History:   Diagnosis Date    Appendicitis        Past Surgical History:   Procedure Laterality Date    HX APPENDECTOMY           History reviewed. No pertinent family history. Social History     Socioeconomic History    Marital status: Not on file     Spouse name: Not on file    Number of children: Not on file    Years of education: Not on file    Highest education level: Not on file   Social Needs    Financial resource strain: Not on file    Food insecurity - worry: Not on file    Food insecurity - inability: Not on file    Transportation needs - medical: Not on file   [employer] needs - non-medical: Not on file   Occupational History    Not on file   Tobacco Use    Smoking status: Never Smoker    Smokeless tobacco: Never Used   Substance and Sexual Activity    Alcohol use: Yes    Drug use: No    Sexual activity: Yes     Partners: Female   Other Topics Concern    Not on file   Social History Narrative    Not on file         ALLERGIES: Patient has no known allergies. Review of Systems   Constitutional: Positive for appetite change, chills and fever.  Negative for activity change. Respiratory: Negative for cough and shortness of breath. Cardiovascular: Negative for chest pain and palpitations. Gastrointestinal: Positive for abdominal pain and nausea. Negative for constipation, diarrhea and vomiting. Genitourinary: Positive for dysuria. Negative for penile pain. Musculoskeletal: Negative for neck pain. Skin: Negative for rash. Neurological: Negative for dizziness, light-headedness and headaches. All other systems reviewed and are negative. Vitals:    12/18/18 2204 12/18/18 2229 12/18/18 2300   BP:  123/72 135/62   Pulse: (!) 115 (!) 113 95   Resp:  16 19   Temp:  (!) 102.9 °F (39.4 °C)    SpO2: 100% 96%    Weight:  81.6 kg (180 lb)    Height:  6' 3\" (1.905 m)             Physical Exam   Constitutional: He is oriented to person, place, and time. He appears well-nourished. No distress. HENT:   Head: Normocephalic and atraumatic. Right Ear: External ear normal.   Left Ear: External ear normal.   Nose: Nose normal.   Mouth/Throat: Oropharynx is clear and moist. No oropharyngeal exudate. Eyes: Conjunctivae and EOM are normal. Pupils are equal, round, and reactive to light. Right eye exhibits no discharge. Left eye exhibits no discharge. No scleral icterus. Neck: Normal range of motion. Neck supple. Cardiovascular: Normal rate, regular rhythm, normal heart sounds and intact distal pulses. Exam reveals no gallop and no friction rub. No murmur heard. Pulmonary/Chest: Effort normal. No respiratory distress. Abdominal: Bowel sounds are normal. He exhibits no distension and no mass. There is tenderness. There is guarding. There is no rebound. Musculoskeletal: Normal range of motion. He exhibits no edema. Neurological: He is alert and oriented to person, place, and time. He has normal strength. No cranial nerve deficit. He exhibits normal muscle tone. Skin: Skin is warm and dry. No rash noted. He is not diaphoretic.    Psychiatric: He has a normal mood and affect. His behavior is normal.   Nursing note and vitals reviewed. MDM  Number of Diagnoses or Management Options  Abdominal pain, right lower quadrant:   Diagnosis management comments: Assessment: abdominal pain, with fever, and recent evaluation for perforated appendicitis-rule out sepsis,. The patient is hemodynamically stable    Plan: llab/ IV fluid/ antiemetics and analgesia/ broad-spectrum antibiotics/ chest x-ray/ CT scan of the abdomen and pelvis/ consult surgery, and and the adults hospitalist         Amount and/or Complexity of Data Reviewed  Clinical lab tests: ordered and reviewed  Tests in the radiology section of CPT®: ordered and reviewed  Tests in the medicine section of CPT®: reviewed and ordered  Discussion of test results with the performing providers: yes  Decide to obtain previous medical records or to obtain history from someone other than the patient: yes  Obtain history from someone other than the patient: yes  Review and summarize past medical records: yes  Discuss the patient with other providers: yes    Risk of Complications, Morbidity, and/or Mortality  Presenting problems: moderate  Diagnostic procedures: moderate  Management options: moderate    Critical Care  Total time providing critical care: (Total critical care time spent exclusive of procedures: 60minutes)         Procedures    Have spoken with attending physician about pt complaint and history. Agrees with plan of care in the emergency room. 11:00PM  Care transferred to Dr. Quinten Vega at change of shift. CONSULT NOTE:  1:40 AM Lidia Ambrocio MD spoke with Dr. Humberto Arredondo, Consult for Surgery. Discussed available diagnostic tests and clinical findings. Dr. Humberto Arredondo will see the patient and consult. recommends having the hospitalist admit because there is nothing surgical to do at this time.     CONSULT NOTE:  2:00 AM Lidia Ambrocio MD spoke with Dr. Mackenzie Rudd, Consult for Hospitalist.  Discussed available diagnostic tests and clinical findings. Dr. Chelsie Reyes will admit the patient.

## 2018-12-19 NOTE — PROGRESS NOTES
Bedside shift change report given to Marci (oncoming nurse) by Kranthi Goodwin (offgoing nurse). Report included the following information SBAR, Kardex, Intake/Output, MAR and Recent Results.

## 2018-12-19 NOTE — PROGRESS NOTES
Primary Nurse Carlos Eduardo Iraheta and Tracy Ovalles, RN performed a dual skin assessment on this patient No impairment noted  Maxx score is 23    Patient has 4 lap sites on abdomen

## 2018-12-19 NOTE — CONSULTS
Called from ED for consult for patient who had lap appendectomy in [facility] for perforated appendicitis. Pt still has some inflammation around colon but there is no evidence of abscess. Questionable foreign material read by radiologist which is likely some type of repair material used for perforated appendix. No surgical issues. Recommend medical evaluation for admission for Iv antibiotic. Diet as tolerated.

## 2018-12-19 NOTE — ED NOTES
0120 pt voided ~600ml clear yellow urine, urine obtained and sent    0308 Dr. Rome Carlson @ bedside    7981 TRANSFER - OUT REPORT:    Verbal report given to Adrienne Pablo RN (name) on Fredi Ferro  being transferred to  (unit) for routine progression of care       Report consisted of patients Situation, Background, Assessment and   Recommendations(SBAR). Information from the following report(s) SBAR and ED Summary was reviewed with the receiving nurse. Lines:   Peripheral IV 12/18/18 Right Antecubital (Active)   Site Assessment Clean, dry, & intact 12/18/2018 11:27 PM   Phlebitis Assessment 0 12/18/2018 11:27 PM   Infiltration Assessment 0 12/18/2018 11:27 PM   Dressing Status Clean, dry, & intact 12/18/2018 11:27 PM   Dressing Type Transparent 12/18/2018 11:27 PM   Hub Color/Line Status Pink 12/18/2018 11:27 PM   Action Taken Blood drawn 12/18/2018 11:27 PM   Alcohol Cap Used Yes 12/18/2018 11:27 PM       Peripheral IV 12/18/18 Left Forearm (Active)   Site Assessment Clean, dry, & intact 12/18/2018 11:28 PM   Phlebitis Assessment 0 12/18/2018 11:28 PM   Infiltration Assessment 0 12/18/2018 11:28 PM   Dressing Status Clean, dry, & intact 12/18/2018 11:28 PM   Dressing Type Transparent 12/18/2018 11:28 PM   Hub Color/Line Status Pink 12/18/2018 11:28 PM   Action Taken Blood drawn 12/18/2018 11:28 PM   Alcohol Cap Used Yes 12/18/2018 11:28 PM        Opportunity for questions and clarification was provided.       0319 pt to CT

## 2018-12-20 LAB
ANION GAP SERPL CALC-SCNC: 5 MMOL/L (ref 5–15)
BASOPHILS # BLD: 0 K/UL (ref 0–0.1)
BASOPHILS NFR BLD: 0 % (ref 0–1)
BUN SERPL-MCNC: 8 MG/DL (ref 6–20)
BUN/CREAT SERPL: 9 (ref 12–20)
CALCIUM SERPL-MCNC: 8.8 MG/DL (ref 8.5–10.1)
CHLORIDE SERPL-SCNC: 106 MMOL/L (ref 97–108)
CO2 SERPL-SCNC: 27 MMOL/L (ref 21–32)
CREAT SERPL-MCNC: 0.91 MG/DL (ref 0.7–1.3)
DATE LAST DOSE: NORMAL
DIFFERENTIAL METHOD BLD: ABNORMAL
EOSINOPHIL # BLD: 0.2 K/UL (ref 0–0.4)
EOSINOPHIL NFR BLD: 2 % (ref 0–7)
ERYTHROCYTE [DISTWIDTH] IN BLOOD BY AUTOMATED COUNT: 12.5 % (ref 11.5–14.5)
GLUCOSE SERPL-MCNC: 85 MG/DL (ref 65–100)
HCT VFR BLD AUTO: 33.3 % (ref 36.6–50.3)
HGB BLD-MCNC: 10.9 G/DL (ref 12.1–17)
IMM GRANULOCYTES # BLD: 0.1 K/UL (ref 0–0.04)
IMM GRANULOCYTES NFR BLD AUTO: 1 % (ref 0–0.5)
LYMPHOCYTES # BLD: 1.5 K/UL (ref 0.8–3.5)
LYMPHOCYTES NFR BLD: 15 % (ref 12–49)
MCH RBC QN AUTO: 29.6 PG (ref 26–34)
MCHC RBC AUTO-ENTMCNC: 32.7 G/DL (ref 30–36.5)
MCV RBC AUTO: 90.5 FL (ref 80–99)
MONOCYTES # BLD: 1 K/UL (ref 0–1)
MONOCYTES NFR BLD: 10 % (ref 5–13)
NEUTS SEG # BLD: 7.4 K/UL (ref 1.8–8)
NEUTS SEG NFR BLD: 72 % (ref 32–75)
NRBC # BLD: 0 K/UL (ref 0–0.01)
NRBC BLD-RTO: 0 PER 100 WBC
PLATELET # BLD AUTO: 235 K/UL (ref 150–400)
PMV BLD AUTO: 10.1 FL (ref 8.9–12.9)
POTASSIUM SERPL-SCNC: 4 MMOL/L (ref 3.5–5.1)
RBC # BLD AUTO: 3.68 M/UL (ref 4.1–5.7)
REPORTED DOSE,DOSE: NORMAL UNITS
REPORTED DOSE/TIME,TMG: NORMAL
SODIUM SERPL-SCNC: 138 MMOL/L (ref 136–145)
VANCOMYCIN TROUGH SERPL-MCNC: 9.6 UG/ML (ref 5–10)
WBC # BLD AUTO: 10.2 K/UL (ref 4.1–11.1)

## 2018-12-20 PROCEDURE — 85025 COMPLETE CBC W/AUTO DIFF WBC: CPT

## 2018-12-20 PROCEDURE — 36415 COLL VENOUS BLD VENIPUNCTURE: CPT

## 2018-12-20 PROCEDURE — 74011250636 HC RX REV CODE- 250/636: Performed by: FAMILY MEDICINE

## 2018-12-20 PROCEDURE — 74011250637 HC RX REV CODE- 250/637: Performed by: SURGERY

## 2018-12-20 PROCEDURE — 80202 ASSAY OF VANCOMYCIN: CPT

## 2018-12-20 PROCEDURE — 80048 BASIC METABOLIC PNL TOTAL CA: CPT

## 2018-12-20 PROCEDURE — 65660000000 HC RM CCU STEPDOWN

## 2018-12-20 PROCEDURE — 74011000258 HC RX REV CODE- 258: Performed by: FAMILY MEDICINE

## 2018-12-20 RX ORDER — DOCUSATE SODIUM 100 MG/1
100 CAPSULE, LIQUID FILLED ORAL 2 TIMES DAILY
Status: DISCONTINUED | OUTPATIENT
Start: 2018-12-20 | End: 2018-12-21 | Stop reason: HOSPADM

## 2018-12-20 RX ORDER — OXYCODONE AND ACETAMINOPHEN 5; 325 MG/1; MG/1
1 TABLET ORAL
Status: DISCONTINUED | OUTPATIENT
Start: 2018-12-20 | End: 2018-12-21 | Stop reason: HOSPADM

## 2018-12-20 RX ADMIN — VANCOMYCIN HYDROCHLORIDE 1250 MG: 10 INJECTION, POWDER, LYOPHILIZED, FOR SOLUTION INTRAVENOUS at 19:28

## 2018-12-20 RX ADMIN — LEVOFLOXACIN 750 MG: 5 INJECTION, SOLUTION INTRAVENOUS at 04:59

## 2018-12-20 RX ADMIN — PIPERACILLIN SODIUM,TAZOBACTAM SODIUM 3.38 G: 3; .375 INJECTION, POWDER, FOR SOLUTION INTRAVENOUS at 07:47

## 2018-12-20 RX ADMIN — VANCOMYCIN HYDROCHLORIDE 1250 MG: 10 INJECTION, POWDER, LYOPHILIZED, FOR SOLUTION INTRAVENOUS at 04:59

## 2018-12-20 RX ADMIN — PIPERACILLIN SODIUM,TAZOBACTAM SODIUM 3.38 G: 3; .375 INJECTION, POWDER, FOR SOLUTION INTRAVENOUS at 00:46

## 2018-12-20 RX ADMIN — DOCUSATE SODIUM 100 MG: 100 CAPSULE, LIQUID FILLED ORAL at 13:12

## 2018-12-20 RX ADMIN — PIPERACILLIN SODIUM,TAZOBACTAM SODIUM 3.38 G: 3; .375 INJECTION, POWDER, FOR SOLUTION INTRAVENOUS at 16:22

## 2018-12-20 RX ADMIN — VANCOMYCIN HYDROCHLORIDE 1250 MG: 10 INJECTION, POWDER, LYOPHILIZED, FOR SOLUTION INTRAVENOUS at 12:18

## 2018-12-20 NOTE — PROGRESS NOTES
Pharmacist Note - Vancomycin Dosing  Therapy day 2  Indication: sepsis  Current regimen: 1250 mg IV q8h    A Trough Level resulted at 9.6 mcg/mL which was obtained ~8 hrs post-dose. Goal trough: 10 - 15 mcg/mL     Plan: Continue current regimen. Pharmacy will continue to monitor this patient daily for changes in clinical status and renal function.

## 2018-12-20 NOTE — PROGRESS NOTES
Problem: Falls - Risk of  Goal: *Absence of Falls  Document Kinsey Fall Risk and appropriate interventions in the flowsheet.   Outcome: Progressing Towards Goal  Fall Risk Interventions:            Medication Interventions: Evaluate medications/consider consulting pharmacy         History of Falls Interventions: Evaluate medications/consider consulting pharmacy

## 2018-12-20 NOTE — PROGRESS NOTES
Bedside shift change report given to Brenda Blair (oncoming nurse) by Joseph Campbell (offgoing nurse). Report included the following information SBAR, Kardex, Intake/Output and MAR.

## 2018-12-20 NOTE — PROGRESS NOTES
Bedside shift change report given to [facility] (oncoming nurse) by Joseph Campbell (offgoing nurse). Report included the following information SBAR, Kardex, Intake/Output and MAR.

## 2018-12-20 NOTE — PROGRESS NOTES
Hospitalist Progress Note  Romy Leal MD  Answering service: [phone] OR [phone] from in house phone        Date of Service:  2018  NAME:  Rubin Pena  :  1995  MRN:  [id number]      Admission Summary:   20year old male, with h/o perforated appendix s/p lap appe in [facility],d/ce on Abx x 5 days  for RLQ abd pain and fever a/w chills body aches,decreased appetite and nausea  Pt admitted with sepsis,T 102.9,tachy,wbc 22k  Interval history / Subjective:   RLQ abd pain,improved, tolerating clears,no nausea vomiting     Afebrile, wbc 10<--19<-22, lactic acid 1.15  Assessment & Plan:     Sepsis s/p lap cosmo for perforated appendix in [facility] de/c 06  -on levaquin,zosyn,vanco,f/u cx  --Fever tachy  Leukocytosis resolved , bld  cx to date neg   -CT with RLQ inflammation, radiopaque foreign body possible surgical material  -surgery consulted Dr Sabrina Lynne attempted to contact [facility] in [facility] to inquire about the '   type of closure material used, No abscess ,no surgical intervention  -advanced diet to GI lite today,monitor if able to tolerate po ,d/c tomorrow on augmentin per surergy   -pain meds,tylenol prn , antiemetics      Code status: Full   DVT prophylaxis:SCD    Care Plan discussed with: Patient/Family and Nurse  Disposition: home tomorrow if tolerate po well      Hospital Problems  Date Reviewed: 2018          Codes Class Noted POA    Leukocytosis ICD-10-CM: W37.831  ICD-9-CM: 288.60  2018 Unknown        Abdominal pain ICD-10-CM: R10.9  ICD-9-CM: 789.00  2018 Unknown        Tachycardia ICD-10-CM: R00.0  ICD-9-CM: 785.0  2018 Unknown        * (Principal) SIRS (systemic inflammatory response syndrome) ([facility] [address] ICD-10-CM: R65.10  ICD-9-CM: 995.90  2018 Unknown                Review of Systems:   A comprehensive review of systems was negative except for that written in the HPI.        Vital Signs: Last 24hrs VS reviewed since prior progress note. Most recent are:  Visit Vitals  /67 (BP 1 Location: Right arm, BP Patient Position: At rest)   Pulse 75   Temp 97.8 °F (36.6 °C)   Resp 16   Ht 6' 3\" (1.905 m)   Wt 81.6 kg (180 lb)   SpO2 99%   BMI 22.50 kg/m²       No intake or output data in the 24 hours ending 12/20/18 1342     Physical Examination:             Constitutional:  No acute distress, cooperative, pleasant    ENT:  Oral mucous moist, oropharynx benign. Neck supple,    Resp:  CTA bilaterally. No wheezing/rhonchi/rales. No accessory muscle use   CV:  Regular rhythm, normal rate, no murmurs, gallops, rubs    GI: Soft,less tender,no  Rebound, guarding + BS     Musculoskeletal:  No edema, warm, 2+ pulses throughout    Neurologic:  Moves all extremities. AAOx3, CN II-XII reviewed     Psych:  Good insight, Not anxious nor agitated. Data Review:    Review and/or order of clinical lab test      Labs:     Recent Labs     12/20/18  0513 12/19/18  0642   WBC 10.2 19.0*   HGB 10.9* 11.6*   HCT 33.3* 35.0*    282     Recent Labs     12/20/18  0513 12/19/18  0642 12/18/18  2319    135* 131*   K 4.0 4.2 4.1    104 96*   CO2 27 25 28   BUN 8 11 14   CREA 0.91 0.82 1.12   GLU 85 94 97   CA 8.8 8.5 9.3     Recent Labs     12/18/18  2319   SGOT 19   ALT 23   AP 86   TBILI 0.7   TP 9.2*   ALB 3.7   GLOB 5.5*     No results for input(s): INR, PTP, APTT in the last 72 hours. No lab exists for component: INREXT, INREXT   No results for input(s): FE, TIBC, PSAT, FERR in the last 72 hours. No results found for: FOL, RBCF   No results for input(s): PH, PCO2, PO2 in the last 72 hours. No results for input(s): CPK, CKNDX, TROIQ in the last 72 hours.     No lab exists for component: CPKMB  No results found for: CHOL, CHOLX, CHLST, CHOLV, HDL, LDL, LDLC, DLDLP, TGLX, TRIGL, TRIGP, CHHD, CHHDX  No results found for: Georgina Guzman  Lab Results   Component Value Date/Time    Color YELLOW/STRAW 12/19/2018 01:15 AM    Appearance CLEAR 12/19/2018 01:15 AM    Specific gravity 1.010 12/19/2018 01:15 AM    pH (UA) 6.0 12/19/2018 01:15 AM    Protein NEGATIVE  12/19/2018 01:15 AM    Glucose NEGATIVE  12/19/2018 01:15 AM    Ketone NEGATIVE  12/19/2018 01:15 AM    Bilirubin NEGATIVE  12/19/2018 01:15 AM    Urobilinogen 0.2 12/19/2018 01:15 AM    Nitrites NEGATIVE  12/19/2018 01:15 AM    Leukocyte Esterase NEGATIVE  12/19/2018 01:15 AM    Epithelial cells FEW 12/19/2018 01:15 AM    Bacteria NEGATIVE  12/19/2018 01:15 AM    WBC 0-4 12/19/2018 01:15 AM    RBC 0-5 12/19/2018 01:15 AM         Medications Reviewed:     Current Facility-Administered Medications   Medication Dose Route Frequency    oxyCODONE-acetaminophen (PERCOCET) 5-325 mg per tablet 1 Tab  1 Tab Oral Q4H PRN    docusate sodium (COLACE) capsule 100 mg  100 mg Oral BID    sodium chloride (NS) flush 5-10 mL  5-10 mL IntraVENous Q8H    sodium chloride (NS) flush 5-10 mL  5-10 mL IntraVENous PRN    sodium chloride (NS) flush 5-10 mL  5-10 mL IntraVENous PRN    0.9% sodium chloride infusion  25 mL/hr IntraVENous CONTINUOUS    piperacillin-tazobactam (ZOSYN) 3.375 g in 0.9% sodium chloride (MBP/ADV) 100 mL  3.375 g IntraVENous Q8H    levoFLOXacin (LEVAQUIN) 750 mg in D5W IVPB  750 mg IntraVENous Q24H    VANCOMYCIN INFORMATION NOTE   Other PRN    fentaNYL citrate (PF) injection 12.5 mcg  12.5 mcg IntraVENous Q4H PRN    ondansetron (ZOFRAN) injection 4 mg  4 mg IntraVENous Q4H PRN    vancomycin (VANCOCIN) 1250 mg in  ml infusion  1,250 mg IntraVENous Q8H    acetaminophen (TYLENOL) tablet 650 mg  650 mg Oral Q4H PRN     ______________________________________________________________________  EXPECTED LENGTH OF STAY: 3d 16h  ACTUAL LENGTH OF STAY:          1                 Therese Major MD

## 2018-12-20 NOTE — PROGRESS NOTES
Progress Note    Patient: Dilcia Malagon MRN: [id number]  SSN: [id number]    YOB: 1995  Age: 20 y.o. Sex: male      Admit Date: 2018    Perforated appendicitis    Subjective:     No acute surgical issues. Pt doing well. Pain is under control. WBC has normalized. Objective:     Visit Vitals  /67 (BP 1 Location: Right arm, BP Patient Position: At rest)   Pulse 75   Temp 97.8 °F (36.6 °C)   Resp 16   Ht 6' 3\" (1.905 m)   Wt 180 lb (81.6 kg)   SpO2 99%   BMI 22.50 kg/m²       Temp (24hrs), Av.7 °F (37.1 °C), Min:97.8 °F (36.6 °C), Max:99.5 °F (37.5 °C)        Physical Exam:    Gen:  NAD  Pulm:  Unlabored  Abd:  S/ND/appropriate TTP  Wound:  C/D/I    Recent Results (from the past 24 hour(s))   CBC WITH AUTOMATED DIFF    Collection Time: 18  5:13 AM   Result Value Ref Range    WBC 10.2 4.1 - 11.1 K/uL    RBC 3.68 (L) 4.10 - 5.70 M/uL    HGB 10.9 (L) 12.1 - 17.0 g/dL    HCT 33.3 (L) 36.6 - 50.3 %    MCV 90.5 80.0 - 99.0 FL    MCH 29.6 26.0 - 34.0 PG    MCHC 32.7 30.0 - 36.5 g/dL    RDW 12.5 11.5 - 14.5 %    PLATELET 795 586 - 562 K/uL    MPV 10.1 8.9 - 12.9 FL    NRBC 0.0 0  WBC    ABSOLUTE NRBC 0.00 0.00 - 0.01 K/uL    NEUTROPHILS 72 32 - 75 %    LYMPHOCYTES 15 12 - 49 %    MONOCYTES 10 5 - 13 %    EOSINOPHILS 2 0 - 7 %    BASOPHILS 0 0 - 1 %    IMMATURE GRANULOCYTES 1 (H) 0.0 - 0.5 %    ABS. NEUTROPHILS 7.4 1.8 - 8.0 K/UL    ABS. LYMPHOCYTES 1.5 0.8 - 3.5 K/UL    ABS. MONOCYTES 1.0 0.0 - 1.0 K/UL    ABS. EOSINOPHILS 0.2 0.0 - 0.4 K/UL    ABS. BASOPHILS 0.0 0.0 - 0.1 K/UL    ABS. IMM.  GRANS. 0.1 (H) 0.00 - 0.04 K/UL    DF AUTOMATED     METABOLIC PANEL, BASIC    Collection Time: 18  5:13 AM   Result Value Ref Range    Sodium 138 136 - 145 mmol/L    Potassium 4.0 3.5 - 5.1 mmol/L    Chloride 106 97 - 108 mmol/L    CO2 27 21 - 32 mmol/L    Anion gap 5 5 - 15 mmol/L    Glucose 85 65 - 100 mg/dL    BUN 8 6 - 20 MG/DL    Creatinine 0.91 0.70 - 1.30 MG/DL BUN/Creatinine ratio 9 (L) 12 - 20      GFR est AA >60 >60 ml/min/1.73m2    GFR est non-AA >60 >60 ml/min/1.73m2    Calcium 8.8 8.5 - 10.1 MG/DL         Assessment:     Hospital Problems  Date Reviewed: 12/19/2018          Codes Class Noted POA    Leukocytosis ICD-10-CM: D72.829  ICD-9-CM: 288.60  12/19/2018 Unknown        Abdominal pain ICD-10-CM: R10.9  ICD-9-CM: 789.00  12/19/2018 Unknown        Tachycardia ICD-10-CM: R00.0  ICD-9-CM: 785.0  12/19/2018 Unknown        * (Principal) SIRS (systemic inflammatory response syndrome) (H[facility]) ICD-10-CM: R65.10  ICD-9-CM: 995.90  12/19/2018 Unknown              Plan/Recommendations/Medical Decision Making:     - Perforated appendicitis: s/p lap appendectomy in [facility].  Inflammation resolving  - Advance to GI lite diet  - Pain control  - Recommend DC patient with 7 days of Augmentin tomorrow if pt is able to tolerate diet without nausea or vomiting    Signed By: Rodney Ontiveros MD     December 20, 2018

## 2018-12-20 NOTE — ROUTINE PROCESS
Bedside shift change report given to [address] Z[address]eta, (oncoming nurse) by Ezio North RN(offgoing nurse). Report given with SBAR.

## 2018-12-21 VITALS
BODY MASS INDEX: 22.38 KG/M2 | TEMPERATURE: 97.9 F | RESPIRATION RATE: 16 BRPM | OXYGEN SATURATION: 98 % | SYSTOLIC BLOOD PRESSURE: 118 MMHG | WEIGHT: 180 LBS | DIASTOLIC BLOOD PRESSURE: 64 MMHG | HEIGHT: 75 IN | HEART RATE: 55 BPM

## 2018-12-21 LAB
ANION GAP SERPL CALC-SCNC: 7 MMOL/L (ref 5–15)
BASOPHILS # BLD: 0 K/UL (ref 0–0.1)
BASOPHILS NFR BLD: 1 % (ref 0–1)
BUN SERPL-MCNC: 10 MG/DL (ref 6–20)
BUN/CREAT SERPL: 12 (ref 12–20)
CALCIUM SERPL-MCNC: 8.8 MG/DL (ref 8.5–10.1)
CHLORIDE SERPL-SCNC: 106 MMOL/L (ref 97–108)
CO2 SERPL-SCNC: 26 MMOL/L (ref 21–32)
CREAT SERPL-MCNC: 0.86 MG/DL (ref 0.7–1.3)
DIFFERENTIAL METHOD BLD: ABNORMAL
EOSINOPHIL # BLD: 0.3 K/UL (ref 0–0.4)
EOSINOPHIL NFR BLD: 5 % (ref 0–7)
ERYTHROCYTE [DISTWIDTH] IN BLOOD BY AUTOMATED COUNT: 12.5 % (ref 11.5–14.5)
GLUCOSE SERPL-MCNC: 90 MG/DL (ref 65–100)
HCT VFR BLD AUTO: 33.2 % (ref 36.6–50.3)
HGB BLD-MCNC: 10.7 G/DL (ref 12.1–17)
IMM GRANULOCYTES # BLD: 0 K/UL (ref 0–0.04)
IMM GRANULOCYTES NFR BLD AUTO: 0 % (ref 0–0.5)
LYMPHOCYTES # BLD: 1.7 K/UL (ref 0.8–3.5)
LYMPHOCYTES NFR BLD: 27 % (ref 12–49)
MCH RBC QN AUTO: 29.2 PG (ref 26–34)
MCHC RBC AUTO-ENTMCNC: 32.2 G/DL (ref 30–36.5)
MCV RBC AUTO: 90.7 FL (ref 80–99)
MONOCYTES # BLD: 0.6 K/UL (ref 0–1)
MONOCYTES NFR BLD: 9 % (ref 5–13)
NEUTS SEG # BLD: 3.7 K/UL (ref 1.8–8)
NEUTS SEG NFR BLD: 59 % (ref 32–75)
NRBC # BLD: 0 K/UL (ref 0–0.01)
NRBC BLD-RTO: 0 PER 100 WBC
PLATELET # BLD AUTO: 265 K/UL (ref 150–400)
PMV BLD AUTO: 10.3 FL (ref 8.9–12.9)
POTASSIUM SERPL-SCNC: 3.9 MMOL/L (ref 3.5–5.1)
RBC # BLD AUTO: 3.66 M/UL (ref 4.1–5.7)
SODIUM SERPL-SCNC: 139 MMOL/L (ref 136–145)
WBC # BLD AUTO: 6.3 K/UL (ref 4.1–11.1)

## 2018-12-21 PROCEDURE — 36415 COLL VENOUS BLD VENIPUNCTURE: CPT

## 2018-12-21 PROCEDURE — 85025 COMPLETE CBC W/AUTO DIFF WBC: CPT

## 2018-12-21 PROCEDURE — 94761 N-INVAS EAR/PLS OXIMETRY MLT: CPT

## 2018-12-21 PROCEDURE — 74011000258 HC RX REV CODE- 258: Performed by: FAMILY MEDICINE

## 2018-12-21 PROCEDURE — 74011250636 HC RX REV CODE- 250/636: Performed by: FAMILY MEDICINE

## 2018-12-21 PROCEDURE — 80048 BASIC METABOLIC PNL TOTAL CA: CPT

## 2018-12-21 PROCEDURE — 74011250636 HC RX REV CODE- 250/636: Performed by: SURGERY

## 2018-12-21 RX ORDER — AMOXICILLIN AND CLAVULANATE POTASSIUM 875; 125 MG/1; MG/1
1 TABLET, FILM COATED ORAL 2 TIMES DAILY
Qty: 14 TAB | Refills: 0 | Status: SHIPPED | OUTPATIENT
Start: 2018-12-21 | End: 2018-12-28

## 2018-12-21 RX ADMIN — VANCOMYCIN HYDROCHLORIDE 1250 MG: 10 INJECTION, POWDER, LYOPHILIZED, FOR SOLUTION INTRAVENOUS at 04:55

## 2018-12-21 RX ADMIN — LEVOFLOXACIN 750 MG: 5 INJECTION, SOLUTION INTRAVENOUS at 04:56

## 2018-12-21 RX ADMIN — PIPERACILLIN SODIUM,TAZOBACTAM SODIUM 3.38 G: 3; .375 INJECTION, POWDER, FOR SOLUTION INTRAVENOUS at 00:00

## 2018-12-21 RX ADMIN — SODIUM CHLORIDE 25 ML/HR: 900 INJECTION, SOLUTION INTRAVENOUS at 10:09

## 2018-12-21 RX ADMIN — PIPERACILLIN SODIUM,TAZOBACTAM SODIUM 3.38 G: 3; .375 INJECTION, POWDER, FOR SOLUTION INTRAVENOUS at 07:33

## 2018-12-21 NOTE — PROGRESS NOTES
I have reviewed discharge instructions with the patient. The patient verbalized understanding. Prescription for Augmentin was given for patient to fill as an outpatient.

## 2018-12-21 NOTE — PROGRESS NOTES
Surgery Progress Note    Admit Date: 12/18/2018      Subjective:     Complaints of some soreness with sitting. Wants to go home. Objective:     Patient Vitals for the past 8 hrs:   BP Temp Pulse Resp SpO2   12/21/18 0838 118/64 97.9 °F (36.6 °C) (!) 55 16    12/21/18 0231 129/70 97.9 °F (36.6 °C) 64 16 98 %     No intake/output data recorded. No intake/output data recorded. ip  Physical Exam:    General: alert, cooperative, no distress  Cardiac: normal S1 and S2  Lungs: Normal chest wall and respirations. Clear to auscultation. Abdomen: soft, nondistended, normal bowel sounds  Wounds:clean, dry  Neuro: alert, oriented x 3, no defects noted in general exam.        CBC:   Lab Results   Component Value Date/Time    WBC 6.3 12/21/2018 05:06 AM    RBC 3.66 (L) 12/21/2018 05:06 AM    HGB 10.7 (L) 12/21/2018 05:06 AM    HCT 33.2 (L) 12/21/2018 05:06 AM    PLATELET 759 69/18/9630 05:06 AM     BMP:   Lab Results   Component Value Date/Time    Glucose 90 12/21/2018 05:06 AM    Sodium 139 12/21/2018 05:06 AM    Potassium 3.9 12/21/2018 05:06 AM    Chloride 106 12/21/2018 05:06 AM    CO2 26 12/21/2018 05:06 AM    BUN 10 12/21/2018 05:06 AM    Creatinine 0.86 12/21/2018 05:06 AM    Calcium 8.8 12/21/2018 05:06 AM     CMP:  Lab Results   Component Value Date/Time    Glucose 90 12/21/2018 05:06 AM    Sodium 139 12/21/2018 05:06 AM    Potassium 3.9 12/21/2018 05:06 AM    Chloride 106 12/21/2018 05:06 AM    CO2 26 12/21/2018 05:06 AM    BUN 10 12/21/2018 05:06 AM    Creatinine 0.86 12/21/2018 05:06 AM    Calcium 8.8 12/21/2018 05:06 AM    Anion gap 7 12/21/2018 05:06 AM    BUN/Creatinine ratio 12 12/21/2018 05:06 AM    Alk. phosphatase 86 12/18/2018 11:19 PM    Protein, total 9.2 (H) 12/18/2018 11:19 PM    Albumin 3.7 12/18/2018 11:19 PM    Globulin 5.5 (H) 12/18/2018 11:19 PM    A-G Ratio 0.7 (L) 12/18/2018 11:19 PM           Assessment:   20year old make status post Lap appendectomy on 12/6/2018 in the [facility]. Developed right lower quadrant pain and fever. Ct showed ; . Significant right lower quadrant inflammation status post surgery for ruptured  appendicitis. There is a radiopaque foreign body in the right lower quadrant and  it is unclear if this is expected surgical material that was used in the  [facility] or if it is a retained foreign body after the surgery. Strong  clinical correlation is recommended. Plan:   Per Dr. Curtis Whitmore report obatined for the [facility] and documented to be a hemo-clip. Ok to discharge on 7 days of Augmentin.    Rosibel Robison NP

## 2018-12-21 NOTE — PROGRESS NOTES
Bedside shift change report given to Kavitha RN (oncoming nurse) by Filemon Arroyo RN (offgoing nurse). Report included the following information SBAR and Kardex.

## 2018-12-23 LAB
BACTERIA SPEC CULT: NORMAL
SERVICE CMNT-IMP: NORMAL

## 2018-12-23 NOTE — DISCHARGE INSTRUCTIONS
Discharge Instructions       PATIENT ID: Kamron Quinones  MRN: [id number]   YOB: 1995    DATE OF ADMISSION: 12/18/2018 10:20 PM    DATE OF DISCHARGE: 12/21/2018    PRIMARY CARE PROVIDER: None     ATTENDING PHYSICIAN: Calderon Roberts MD  DISCHARGING PROVIDER: Santa Logan MD    To contact this individual call [phone] and ask the  to page. If unavailable ask to be transferred the Adult Hospitalist Department. DISCHARGE DIAGNOSES: Perforated appendicitis s/p lap appendectomy in [facility] with abdominal pain due to inflammation        CONSULTATIONS: IP CONSULT TO GENERAL SURGERY  IP CONSULT TO HOSPITALIST    PROCEDURES/SURGERIES: * No surgery found *    PENDING TEST RESULTS:   At the time of discharge the following test results are still pending: none      LUNG BASES: Clear. INCIDENTALLY IMAGED HEART AND MEDIASTINUM: Unremarkable. LIVER: No mass or biliary dilatation. GALLBLADDER: Unremarkable. SPLEEN: No mass. PANCREAS: No mass or ductal dilatation. ADRENALS: Unremarkable. KIDNEYS: No mass, calculus, or hydronephrosis. STOMACH: Unremarkable. SMALL BOWEL: No dilatation or wall thickening. COLON: Thickening of the ascending colon. Significant inflammation in the right  lower quadrant. Radiopaque density in the right lower quadrant measures 14 mm. APPENDIX: Unremarkable. PERITONEUM: Mild pelvic fluid. RETROPERITONEUM: No lymphadenopathy or aortic aneurysm. REPRODUCTIVE ORGANS: Normal.  URINARY BLADDER: No mass or calculus. BONES: No destructive bone lesion. ADDITIONAL COMMENTS: N/A     IMPRESSION  IMPRESSION:  Significant right lower quadrant inflammation status post surgery for ruptured  appendicitis. There is a radiopaque foreign body in the right lower quadrant and  it is unclear if this is expected surgical material that was used in the  [facility] or if it is a retained foreign body after the surgery.  Strong  clinical correlation is recommended. CONFIRMED with operative report from [facility] it is a hemo-clip. FOLLOW UP APPOINTMENTS:   Follow-up Information     Follow up With Specialties Details Why Contact Info    Terrence Morgan MD General Surgery  As needed, If symptoms worsen [address]  [address]  [phone]      PCP  In 3 days  None (395) Patient stated that they have no PCP             ADDITIONAL CARE RECOMMENDATIONS:  1)Return to ER if worsening abdominal pain or worsening symptoms    DIET: Regular Diet      ACTIVITY: Activity as tolerated    WOUND CARE: na    EQUIPMENT needed: na    DISCHARGE MEDICATIONS:   See Medication Reconciliation Form    · It is important that you take the medication exactly as they are prescribed. · Keep your medication in the bottles provided by the pharmacist and keep a list of the medication names, dosages, and times to be taken in your wallet. · Do not take other medications without consulting your doctor. NOTIFY YOUR PHYSICIAN FOR ANY OF THE FOLLOWING:   Fever over 101 degrees for 24 hours. Chest pain, shortness of breath, fever, chills, nausea, vomiting, diarrhea, change in mentation, falling, weakness, bleeding. Severe pain or pain not relieved by medications. Or, any other signs or symptoms that you may have questions about.       DISPOSITION:  X  Home With:   OT  PT  HH  RN       SNF/Inpatient Rehab/LTAC    Independent/assisted living    Hospice    Other:           Signed:   Bridger Jiménez MD  12/21/2018  9:02 AM

## 2018-12-23 NOTE — DISCHARGE SUMMARY
Discharge Summary       PATIENT ID: Amelia Leslie  MRN: [id number]   YOB: 1995    DATE OF ADMISSION: 12/18/2018 10:20 PM    DATE OF DISCHARGE:12/21/2018  PRIMARY CARE PROVIDER: None     ATTENDING Matteo Wood MD    DISCHARGING PROVIDER: Leidy Valdez MD    To contact this individual call [phone] and ask the  to page. If unavailable ask to be transferred the Adult Hospitalist Department. CONSULTATIONS: IP CONSULT TO GENERAL SURGERY    PROCEDURES/SURGERIES: * No surgery found *    ADMITTING DIAGNOSES & HOSPITAL COURSE:   20 y.o M with PMH of recent appendicitis s/p laparoscopic appendectomy at [address] [place] [address] came to the hospital because of abdominal pain and fever. CT abdomen was done which showed significant Significant right lower quadrant inflammation status post surgery for ruptured  appendicitis. There is a radiopaque foreign body in the right lower quadrant and  it is unclear if this is expected surgical material that was used in the  [address] [place] [address] or if it is a retained foreign body after the surgery. General surgery was consulted. Patient was started on IV vancomycin,zosyn and levaquin. Records were obtained from [facility] by  - the foreign body was documented as hemo clip. His white count has normalized,no more fever and patient was able to tolerate diet. Patient was discharge on 7 more days of augmentin. Xr Chest Pa Lat    Result Date: 12/19/2018  IMPRESSION: NORMAL CHEST. Ct Chest Wo Cont    Result Date: 12/19/2018  IMPRESSION: Normal chest CT. Ct Abd Pelv W Cont    Result Date: 12/19/2018  IMPRESSION: Significant right lower quadrant inflammation status post surgery for ruptured appendicitis. There is a radiopaque foreign body in the right lower quadrant and it is unclear if this is expected surgical material that was used in the [address] [place] [address] or if it is a retained foreign body after the surgery.  Strong clinical correlation is recommended. PENDING TEST RESULTS:   At the time of discharge the following test results are still pending: none  FOLLOW UP APPOINTMENTS:    Follow-up Information     Follow up With Specialties Details Why Contact Info    Walt Vanegas MD General Surgery  As needed, If symptoms worsen [address]  [address]  [phone]      None  In 3 days  None (395) Patient stated that they have no PCP             ADDITIONAL CARE RECOMMENDATIONS:  1)Return to ER if worsening abdominal pain or worsening symptoms    DIET: Regular Diet      ACTIVITY: Activity as tolerated    WOUND CARE: na    EQUIPMENT needed: na      DISCHARGE MEDICATIONS:  Discharge Medication List as of 12/21/2018 11:55 AM      START taking these medications    Details   amoxicillin-clavulanate (AUGMENTIN) 875-125 mg per tablet Take 1 Tab by mouth two (2) times a day for 7 days. , Print, Disp-14 Tab, R-0               NOTIFY YOUR PHYSICIAN FOR ANY OF THE FOLLOWING:   Fever over 101 degrees for 24 hours. Chest pain, shortness of breath, fever, chills, nausea, vomiting, diarrhea, change in mentation, falling, weakness, bleeding. Severe pain or pain not relieved by medications. Or, any other signs or symptoms that you may have questions about. DISPOSITION:   X Home With:   OT  PT  HH  RN       Long term SNF/Inpatient Rehab    Independent/assisted living    Hospice    Other:       PATIENT CONDITION AT DISCHARGE:     Functional status    Poor     Deconditioned    X Independent      Cognition    X Lucid     Forgetful     Dementia      Catheters/lines (plus indication)    Fry     PICC     PEG    X None      Code status   X  Full code     DNR      PHYSICAL EXAMINATION AT DISCHARGE:  Gen:  Well-developed, well-nourished, in no acute distress  HEENT:  EOMI,anicteric, no pallor,hearing intact to voice, moist mucous membranes,sinus non tender  Resp:  No accessory muscle use, clear breath sounds without wheezes rales or rhonchi  Card:   No murmurs, normal S1, S2 without thrills, bruits or peripheral edema  Abd:  Soft, non-tender, non-distended, normoactive bowel sounds are present  Skin:  No rashes or ulcers, skin turgor is good  Neuro: no focal deficits           CHRONIC MEDICAL DIAGNOSES:  Problem List as of 12/21/2018 Date Reviewed: 12/19/2018          Codes Class Noted - Resolved    Leukocytosis ICD-10-CM: J06.152  ICD-9-CM: 288.60  12/19/2018 - Present        Abdominal pain ICD-10-CM: R10.9  ICD-9-CM: 789.00  12/19/2018 - Present        Tachycardia ICD-10-CM: R00.0  ICD-9-CM: 785.0  12/19/2018 - Present        * (Principal) [facility] response syndrome) [facility] ICD-10-CM: R65.10  ICD-9-CM: 995.90  12/19/2018 - Present              25  minutes were spent with the patient on counseling and coordination of care    Signed:   Palma Graham MD  12/23/2018  5:07 PM